# Patient Record
Sex: FEMALE | Race: WHITE | URBAN - METROPOLITAN AREA
[De-identification: names, ages, dates, MRNs, and addresses within clinical notes are randomized per-mention and may not be internally consistent; named-entity substitution may affect disease eponyms.]

---

## 2018-08-23 NOTE — PATIENT DISCUSSION
CENTRL RETINAL VEIN OCCLUSION, OS: CHRONIC CRVO S/P COMPLETE PRP LASER. NO CENTRAL MACULAR EDEMA OR RETINAL NEOVASCULARIZATION. NO TREATMENT NEEDED AT THIS TIME. CONTINUE TO MONITOR.

## 2018-08-23 NOTE — PATIENT DISCUSSION
Central Retinal Vein Occlusion Counseling: Follow-up is necessary to evaluate for serious complications that can occur, including macular edema, formation of new, abnormal blood vessels, and elevated eye pressure. Return as scheduled to minimize chance of problems or further loss of vision from these complications.

## 2018-08-23 NOTE — PATIENT DISCUSSION
Continue: timolol maleate (timolol maleate): drops, once daily: 0.5% 1 drop every morning as directed into both eyes

## 2018-08-23 NOTE — PATIENT DISCUSSION
STABLE NON-EXUDATIVE AMD, OU:  CENTRAL GEOGRAPHIC ATROPHY OS; CONTINUE AREDS 2 VITAMINS / AMSLER GRID QD/ UV PROTECTION. SMOKING AVOIDANCE REVIEWED. RETURN FOR FOLLOW-UP AS SCHEDULED.

## 2019-03-11 NOTE — PATIENT DISCUSSION
CENTRL RETINAL VEIN OCCLUSION, OS: NO CENTRAL MACULAR EDEMA. NO TREATMENT NEEDED AT THIS TIME. CONTINUE TO MONITOR.

## 2022-10-11 ENCOUNTER — ESTABLISHED PATIENT (OUTPATIENT)
Facility: LOCATION | Age: 47
End: 2022-10-11

## 2022-10-11 DIAGNOSIS — H52.4: ICD-10-CM

## 2022-10-11 DIAGNOSIS — H52.13: ICD-10-CM

## 2022-10-11 PROCEDURE — 92310CLE CONTACT LENS EXAM

## 2022-10-11 PROCEDURE — 92014 COMPRE OPH EXAM EST PT 1/>: CPT

## 2022-10-11 PROCEDURE — 92015KEC REFRACTION KEC

## 2022-10-11 ASSESSMENT — VISUAL ACUITY
OD_CC: 20/25
OS_CC: 20/20
OD_CC: J3
OS_CC: J2

## 2022-10-11 ASSESSMENT — TONOMETRY
OS_IOP_MMHG: 13
OD_IOP_MMHG: 13

## 2023-10-30 ENCOUNTER — ESTABLISHED PATIENT (OUTPATIENT)
Facility: LOCATION | Age: 48
End: 2023-10-30

## 2023-10-30 DIAGNOSIS — H52.13: ICD-10-CM

## 2023-10-30 DIAGNOSIS — H52.4: ICD-10-CM

## 2023-10-30 PROCEDURE — 92310CLE CONTACT LENS EXAM

## 2023-10-30 PROCEDURE — 92015KEC REFRACTION KEC

## 2023-10-30 PROCEDURE — 92014 COMPRE OPH EXAM EST PT 1/>: CPT

## 2023-10-30 ASSESSMENT — VISUAL ACUITY
OD_CC: 20/30-2
OS_CC: 20/60

## 2023-10-30 ASSESSMENT — TONOMETRY
OS_IOP_MMHG: 15
OD_IOP_MMHG: 12

## 2024-11-04 ENCOUNTER — COMPREHENSIVE EXAM (OUTPATIENT)
Facility: LOCATION | Age: 49
End: 2024-11-04

## 2024-11-04 DIAGNOSIS — H52.13: ICD-10-CM

## 2024-11-04 DIAGNOSIS — H52.4: ICD-10-CM

## 2024-11-04 PROCEDURE — 92310-1 LEVEL 1 SOFT LENS UPDATE

## 2024-11-04 PROCEDURE — 92014 COMPRE OPH EXAM EST PT 1/>: CPT

## 2024-11-04 PROCEDURE — 92015KEC REFRACTION KEC
